# Patient Record
Sex: FEMALE | Race: BLACK OR AFRICAN AMERICAN | NOT HISPANIC OR LATINO | URBAN - METROPOLITAN AREA
[De-identification: names, ages, dates, MRNs, and addresses within clinical notes are randomized per-mention and may not be internally consistent; named-entity substitution may affect disease eponyms.]

---

## 2024-11-08 ENCOUNTER — OFFICE VISIT (OUTPATIENT)
Dept: URBAN - METROPOLITAN AREA CLINIC 115 | Facility: CLINIC | Age: 84
End: 2024-11-08

## 2024-11-20 ENCOUNTER — OFFICE VISIT (OUTPATIENT)
Dept: URBAN - METROPOLITAN AREA CLINIC 115 | Facility: CLINIC | Age: 84
End: 2024-11-20
Payer: COMMERCIAL

## 2024-11-20 ENCOUNTER — LAB OUTSIDE AN ENCOUNTER (OUTPATIENT)
Dept: URBAN - METROPOLITAN AREA CLINIC 115 | Facility: CLINIC | Age: 84
End: 2024-11-20

## 2024-11-20 ENCOUNTER — DASHBOARD ENCOUNTERS (OUTPATIENT)
Age: 84
End: 2024-11-20

## 2024-11-20 VITALS
WEIGHT: 174 LBS | TEMPERATURE: 97.8 F | HEIGHT: 62 IN | SYSTOLIC BLOOD PRESSURE: 163 MMHG | BODY MASS INDEX: 32.02 KG/M2 | DIASTOLIC BLOOD PRESSURE: 83 MMHG | HEART RATE: 70 BPM

## 2024-11-20 DIAGNOSIS — R79.89 HIGH SERUM THYROID STIMULATING HORMONE (TSH): ICD-10-CM

## 2024-11-20 DIAGNOSIS — R06.02 SHORTNESS OF BREATH: ICD-10-CM

## 2024-11-20 DIAGNOSIS — Z79.01 ON CONTINUOUS ORAL ANTICOAGULATION: ICD-10-CM

## 2024-11-20 DIAGNOSIS — I48.20 CHRONIC A-FIB: ICD-10-CM

## 2024-11-20 DIAGNOSIS — R19.5 OCCULT BLOOD POSITIVE STOOL: ICD-10-CM

## 2024-11-20 DIAGNOSIS — D50.8 OTHER IRON DEFICIENCY ANEMIA: ICD-10-CM

## 2024-11-20 PROBLEM — 87522002: Status: ACTIVE | Noted: 2024-11-20

## 2024-11-20 PROBLEM — 309298003: Status: ACTIVE | Noted: 2024-11-20

## 2024-11-20 PROBLEM — 426749004: Status: ACTIVE | Noted: 2024-11-20

## 2024-11-20 PROCEDURE — 99244 OFF/OP CNSLTJ NEW/EST MOD 40: CPT

## 2024-11-20 RX ORDER — HYDROCHLOROTHIAZIDE 12.5 MG/1
1 CAPSULE IN THE MORNING CAPSULE, GELATIN COATED ORAL ONCE A DAY
Qty: 30 | Status: ACTIVE | COMMUNITY
Start: 2024-11-20 | End: 2024-12-20

## 2024-11-20 RX ORDER — METOPROLOL SUCCINATE 25 MG/1
TAKE 1/2 TABLET BY MOUTH DAILY TABLET, FILM COATED, EXTENDED RELEASE ORAL
Qty: 45 EACH | Refills: 0 | Status: ACTIVE | COMMUNITY

## 2024-11-20 RX ORDER — ROSUVASTATIN 20 MG/1
TAKE 1 TABLET BY MOUTH EVERY NIGHT TABLET, FILM COATED ORAL
Qty: 90 EACH | Refills: 0 | Status: ACTIVE | COMMUNITY

## 2024-11-20 RX ORDER — LOSARTAN POTASSIUM 50 MG/1
TAKE 1 TABLET BY MOUTH DAILY TABLET, FILM COATED ORAL
Qty: 90 EACH | Refills: 0 | Status: ACTIVE | COMMUNITY

## 2024-11-20 RX ORDER — RIVAROXABAN 20 MG/1
TAKE 1 TABLET BY MOUTH DAILY TABLET, FILM COATED ORAL
Qty: 30 EACH | Refills: 0 | Status: ACTIVE | COMMUNITY

## 2024-11-20 RX ORDER — FUROSEMIDE 20 MG/1
TAKE 2 TABLETS BY MOUTH DAILY AS NEEDED TABLET ORAL
Qty: 180 EACH | Refills: 0 | Status: ACTIVE | COMMUNITY

## 2024-11-20 NOTE — HPI-TODAY'S VISIT:
83 y/o F with PMH of afib on xarelto, partial lung removal, HTN, HLD presents on referral from Dr. Nettles with c/o pos FOBT and intermittent anemia. A copy of this note will be sent to referring provider.  Here today with her daughter who is helping to translate and providing history.  Went to ER 10/10/24 for GLEZ after recent travel from [place]. CXR done showing possibe COPD changes (coarsened interstitial lung markings, perihilar pulm vasculature prominence, blunting of R costophrenic angle not confirmed on lat view). Doppler neg for DVT. Labs showed Hgb 8.9, MCV 79.8, normal CMP, neg troponin. 10/12/24 labs showed iron 28, 8% saturation, ferritin 15. TSH on 10/1/24 was 5.95 w/ normal T4. Started iron supplements BID since then. Has upcoming appt with hematologist for her intermittent anemia.  States that when she takes lasix, she does not have SOB. Has appt 11/27/24 with cardiologist. Does note she has a eye surgery upcoming early December, because "she has been bleeding behind the eye". Denies sathya bleeding. BMs are daily, complete evacuation. Does note decreased appetite d/t early satiety.  Denies abdominal pain, n/v, diarrhea, constipation, dysphagia, odynophagia, unintentional weight loss. Not on diclofenac; takes tylenol daily. Denies EtOH/tobacco/marijuana use.  Last colonoscopy: unsure

## 2024-11-21 LAB
FERRITIN, SERUM: 71
HEMATOCRIT: 37.1
HEMOGLOBIN: 11.1
IRON BIND.CAP.(TIBC): 364
IRON SATURATION: 20
IRON: 73
MCH: 24.6
MCHC: 29.9
MCV: 82.3
MPV: 11.3
PLATELET COUNT: 229
RDW: 20.4
RED BLOOD CELL COUNT: 4.51
T4, FREE: 1
TSH W/REFLEX TO FT4: 5.85
WHITE BLOOD CELL COUNT: 5.5

## 2024-12-09 ENCOUNTER — TELEPHONE ENCOUNTER (OUTPATIENT)
Dept: URBAN - METROPOLITAN AREA CLINIC 115 | Facility: CLINIC | Age: 84
End: 2024-12-09

## 2024-12-12 ENCOUNTER — TELEPHONE ENCOUNTER (OUTPATIENT)
Dept: URBAN - METROPOLITAN AREA CLINIC 5 | Facility: CLINIC | Age: 84
End: 2024-12-12

## 2025-01-08 ENCOUNTER — TELEPHONE ENCOUNTER (OUTPATIENT)
Dept: URBAN - METROPOLITAN AREA CLINIC 115 | Facility: CLINIC | Age: 85
End: 2025-01-08

## 2025-01-09 ENCOUNTER — OFFICE VISIT (OUTPATIENT)
Dept: URBAN - METROPOLITAN AREA MEDICAL CENTER 31 | Facility: MEDICAL CENTER | Age: 85
End: 2025-01-09
Payer: MEDICARE

## 2025-01-09 DIAGNOSIS — K29.50 ANTRAL GASTRITIS: ICD-10-CM

## 2025-01-09 DIAGNOSIS — D50.9 ANEMIA: ICD-10-CM

## 2025-01-09 PROCEDURE — 43239 EGD BIOPSY SINGLE/MULTIPLE: CPT | Performed by: INTERNAL MEDICINE

## 2025-01-09 PROCEDURE — 45378 DIAGNOSTIC COLONOSCOPY: CPT | Performed by: INTERNAL MEDICINE
